# Patient Record
(demographics unavailable — no encounter records)

---

## 2024-10-15 NOTE — PHYSICAL EXAM
[de-identified] : Patient has a wrist widget brace on.  Patient does have some tenderness palpation around the distal ulna.  There is no erythema ecchymoses or abrasions.  There is no masses.  There is no instability at the DRUJ.

## 2024-10-15 NOTE — HISTORY OF PRESENT ILLNESS
[de-identified] : 53-year-old male comes in with right-sided wrist pain discomfort.  He actually is feeling a bit better with the wrist widget brace.  Still having pain and discomfort in the wrist.  But it is better than it was before.  Still not working.  Still totally disabled.

## 2024-10-15 NOTE — ASSESSMENT
[FreeTextEntry1] : Patient is a right-sided TFCC tear.  His pain is improved with the use of the wrist widget brace.  Still unable to do his work activities.  He will see us back in 6 weeks.  He will continue with his therapy program which is just starting now.  Right now there is no indication for any surgical intervention.

## 2024-11-25 NOTE — HISTORY OF PRESENT ILLNESS
[de-identified] : 53-year-old male right-sided wrist pain discomfort.  He actually is a little bit better wearing the brace.  Start a therapy program.  Working.  Wrist only a mild partial temporary disability.  He does have other issues going on.  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

## 2024-11-25 NOTE — PHYSICAL EXAM
[de-identified] : Patient has a wrist widget on.  Patient has some tenderness to palpation on the ulnar side of the wrist.  Patient pronation there is no instability ecchymoses or abrasions.

## 2024-11-25 NOTE — ASSESSMENT
[FreeTextEntry1] : Patient is a right-sided TFCC tear.  Patient is being treated conservatively.  Continue to wear the brace therapy program.  See me back in 6 weeks.  He reports that he is already applied for surgical already disability.  His multiple injuries he will not be able to return back to work activity.  See me back in 6 weeks.

## 2025-01-08 NOTE — HISTORY OF PRESENT ILLNESS
The patient is Stable - Low risk of patient condition declining or worsening    Shift Goals  Clinical Goals: pt pain will remain at or below 3/10 with medication per MAR, monitor colostomy output, pt will remain free of fall throighout the day.  Patient Goals: Rest and comfort  Family Goals: N/A    Progress made toward(s) clinical / shift goals:  pain was managed with medication per MAR, colostomy output monitored, remained free from falls.    Problem: Knowledge Deficit - Standard  Goal: Patient and family/care givers will demonstrate understanding of plan of care, disease process/condition, diagnostic tests and medications  Outcome: Progressing     Problem: Respiratory  Goal: Patient will achieve/maintain optimum respiratory ventilation and gas exchange  Outcome: Progressing     Problem: Fall Risk  Goal: Patient will remain free from falls  Outcome: Progressing     Problem: Bowel Elimination  Goal: Establish and maintain regular bowel function  Outcome: Progressing     Problem: Pain - Standard  Goal: Alleviation of pain or a reduction in pain to the patient’s comfort goal  Outcome: Progressing     Problem: Skin Care - Ostomy  Goal: Skin remains free from irritation  Outcome: Progressing     Problem: Knowledge Deficit - Ostomy  Goal: Patient will demonstrate ability to manage and maintain ostomy  Outcome: Progressing     Problem: Discharge Barriers/Self-Care - Ostomy  Goal: Ostomy patient's continuum of care needs will be met  Outcome: Progressing       Patient is not progressing towards the following goals:       [de-identified] :   Patient is a 53-year-old male here for evaluation of his right wrist. He has a history of a TFCC tear from a work-related injury that occurred in May 2024.  The Worker's Comp. only improved about 6 sessions of therapy.  He was doing well with the therapy but he has not been since he is waiting for the approval.  He has been wearing the wrist widget.  He is currently not working.

## 2025-01-08 NOTE — PHYSICAL EXAM
[de-identified] : Physical exam of the right wrist: Negative swelling or ecchymosis.  Nontender over the distal radius or distal ulna.  Negative anatomical snuffbox tenderness.  Pain over the TFCC.  Pain with supination and pronation of the wrist.  He can make a fist.  Sensory and motor are intact.

## 2025-01-08 NOTE — DISCUSSION/SUMMARY
[de-identified] :   Patient has a TFCC tear.  He states there was noticeable improvement with the therapy, however only 6 sessions were approved.  Will submit for authorization for more therapy.  He is temporarily totally disabled and unable to return to work at this time.  He will follow-up in about 6 weeks for repeat evaluation.  All questions were answered today.